# Patient Record
Sex: FEMALE | Race: WHITE | Employment: STUDENT | URBAN - NONMETROPOLITAN AREA
[De-identification: names, ages, dates, MRNs, and addresses within clinical notes are randomized per-mention and may not be internally consistent; named-entity substitution may affect disease eponyms.]

---
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
## 2024-06-03 ENCOUNTER — OFFICE VISIT (OUTPATIENT)
Dept: OBGYN CLINIC | Age: 19
End: 2024-06-03
Payer: COMMERCIAL

## 2024-06-03 VITALS
WEIGHT: 128 LBS | BODY MASS INDEX: 19.4 KG/M2 | DIASTOLIC BLOOD PRESSURE: 60 MMHG | SYSTOLIC BLOOD PRESSURE: 100 MMHG | HEIGHT: 68 IN

## 2024-06-03 DIAGNOSIS — N92.1 MENORRHAGIA WITH IRREGULAR CYCLE: ICD-10-CM

## 2024-06-03 DIAGNOSIS — N92.6 IRREGULAR MENSES: Primary | ICD-10-CM

## 2024-06-03 DIAGNOSIS — N89.8 VAGINAL DISCHARGE: ICD-10-CM

## 2024-06-03 PROCEDURE — G8420 CALC BMI NORM PARAMETERS: HCPCS | Performed by: ADVANCED PRACTICE MIDWIFE

## 2024-06-03 PROCEDURE — 1036F TOBACCO NON-USER: CPT | Performed by: ADVANCED PRACTICE MIDWIFE

## 2024-06-03 PROCEDURE — 99203 OFFICE O/P NEW LOW 30 MIN: CPT | Performed by: ADVANCED PRACTICE MIDWIFE

## 2024-06-03 PROCEDURE — G8427 DOCREV CUR MEDS BY ELIG CLIN: HCPCS | Performed by: ADVANCED PRACTICE MIDWIFE

## 2024-06-03 ASSESSMENT — ENCOUNTER SYMPTOMS
GASTROINTESTINAL NEGATIVE: 1
RESPIRATORY NEGATIVE: 1

## 2024-06-03 NOTE — PROGRESS NOTES
PROBLEM VISIT     Date of service: 6/3/2024    Belinda Kaur  Is a 18 y.o.  female    PT's PCP is: Mikhail Hansen DO     : 2005                                          HPI Here to establish care and discuss normal vs abnormal and guidelines for pap smears.  Reports her mother told her time to start annual exams so came for visit.  Menses onset age 11 or 12 and have always been pretty regular.  However, was on OCP for period of time while using Accutane for acne - from [facility] - and recently stopped it.  Since stopping OCP cycles have been a little irregular.    Prior to OCP would bleed for 5-7 days, while on OCP cycles were 3 days.  Has dysmenorrhea day prior and day 1 of cycle.  Menses are heavy for first day or two.   Became sexually active at age 16 - uses condoms \"with all but 1 partner\".  No current partner x 2-2.5 months.  Has had 5 partners in total.  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    Would like to discuss normal vaginal discharge from abnormal infectious discharge.     Review of Systems   Constitutional: Negative.    HENT: Negative.     Respiratory: Negative.     Gastrointestinal: Negative.    Genitourinary:  Positive for menstrual problem (irregular since stopping OCP) and vaginal discharge. Negative for pelvic pain and vaginal pain.   Neurological: Negative.    Psychiatric/Behavioral: Negative.         No LMP recorded (lmp unknown).   OB History    Para Term  AB Living   0 0 0 0 0 0   SAB IAB Ectopic Molar Multiple Live Births   0 0 0   0          Social History     Tobacco Use   Smoking Status Never   Smokeless Tobacco Never        Social History     Substance and Sexual Activity   Alcohol Use Yes    Alcohol/week: 1.0 standard drink of alcohol    Types: 1 Drinks containing 0.5 oz of alcohol per week       Allergies: Patient has no known allergies.    No current outpatient medications on file.    Social History

## 2025-06-20 NOTE — PROGRESS NOTES
YEARLY PHYSICAL    Date of service: 2025    Belinda Kaur  Is a 19 y.o.   female    PT's PCP is: Mikhail Hansen DO     : 2005                                             Subjective:       Patient's last menstrual period was 2025 (exact date).     Are your menses regular: yes    OB History    Para Term  AB Living   0 0 0 0 0 0   SAB IAB Ectopic Molar Multiple Live Births   0 0 0  0         Social History     Tobacco Use   Smoking Status Never   Smokeless Tobacco Never        Social History     Substance and Sexual Activity   Alcohol Use Yes    Alcohol/week: 1.0 standard drink of alcohol    Types: 1 Drinks containing 0.5 oz of alcohol per week       Family History   Problem Relation Age of Onset    Colon Cancer Maternal Grandmother     Colon Cancer Maternal Grandfather     Colon Cancer Paternal Grandmother        Any family history of breast or ovarian cancer: No    Any family history of blood clots: No      Allergies: Patient has no known allergies.      Current Outpatient Medications:     LORYNA 3-0.02 MG per tablet, Take 1 tablet by mouth daily, Disp: 1 packet, Rfl: 12    Social History     Substance and Sexual Activity   Sexual Activity Yes    Partners: Male       Any bleeding or pain with intercourse: Not often - sometimes with certain angles.     Last Yearly:  First annual    Last pap: First pap smear    Do you do self breast exams: Encouraged    Past Medical History:   Diagnosis Date    Acne     Anxiety     Used prozac in past - not current       History reviewed. No pertinent surgical history.    Family History   Problem Relation Age of Onset    Colon Cancer Maternal Grandmother     Colon Cancer Maternal Grandfather     Colon Cancer Paternal Grandmother        Chief Complaint   Patient presents with    Annual Exam     1st pap due. States when felt inside noticed a lump on left side.        Labs:    No

## 2025-06-23 ENCOUNTER — OFFICE VISIT (OUTPATIENT)
Dept: OBGYN CLINIC | Age: 20
End: 2025-06-23
Payer: COMMERCIAL

## 2025-06-23 ENCOUNTER — HOSPITAL ENCOUNTER (OUTPATIENT)
Age: 20
Setting detail: SPECIMEN
Discharge: HOME | End: 2025-06-23

## 2025-06-23 VITALS
WEIGHT: 131 LBS | BODY MASS INDEX: 19.85 KG/M2 | HEIGHT: 68 IN | DIASTOLIC BLOOD PRESSURE: 70 MMHG | SYSTOLIC BLOOD PRESSURE: 100 MMHG

## 2025-06-23 DIAGNOSIS — Z12.4 SCREENING FOR CERVICAL CANCER: ICD-10-CM

## 2025-06-23 DIAGNOSIS — Z30.41 ORAL CONTRACEPTIVE PILL SURVEILLANCE: ICD-10-CM

## 2025-06-23 DIAGNOSIS — Z01.419 VISIT FOR GYNECOLOGIC EXAMINATION: Primary | ICD-10-CM

## 2025-06-23 PROCEDURE — 99395 PREV VISIT EST AGE 18-39: CPT | Performed by: ADVANCED PRACTICE MIDWIFE

## 2025-06-23 RX ORDER — DROSPIRENONE AND ETHINYL ESTRADIOL TABLETS 0.02-3(28)
KIT ORAL
COMMUNITY
Start: 2024-09-20 | End: 2025-06-23 | Stop reason: SDUPTHER

## 2025-06-23 RX ORDER — DROSPIRENONE AND ETHINYL ESTRADIOL TABLETS 0.02-3(28)
1 KIT ORAL DAILY
Qty: 1 PACKET | Refills: 12 | Status: SHIPPED | OUTPATIENT
Start: 2025-06-23

## 2025-06-28 LAB — CYTOLOGY REPORT: NORMAL

## 2025-06-30 ENCOUNTER — RESULTS FOLLOW-UP (OUTPATIENT)
Dept: OBGYN CLINIC | Age: 20
End: 2025-06-30